# Patient Record
Sex: FEMALE | Race: WHITE | Employment: FULL TIME | URBAN - METROPOLITAN AREA
[De-identification: names, ages, dates, MRNs, and addresses within clinical notes are randomized per-mention and may not be internally consistent; named-entity substitution may affect disease eponyms.]

---

## 2017-03-21 ENCOUNTER — IP HISTORICAL/CONVERTED ENCOUNTER (OUTPATIENT)
Dept: OTHER | Age: 47
End: 2017-03-21

## 2018-01-24 ENCOUNTER — OFFICE VISIT (OUTPATIENT)
Dept: ENDOCRINOLOGY | Age: 48
End: 2018-01-24

## 2018-01-24 VITALS
OXYGEN SATURATION: 95 % | HEART RATE: 88 BPM | DIASTOLIC BLOOD PRESSURE: 89 MMHG | HEIGHT: 64 IN | WEIGHT: 179.2 LBS | BODY MASS INDEX: 30.59 KG/M2 | SYSTOLIC BLOOD PRESSURE: 131 MMHG | RESPIRATION RATE: 14 BRPM | TEMPERATURE: 98.4 F

## 2018-01-24 DIAGNOSIS — L68.0 HIRSUTISM: ICD-10-CM

## 2018-01-24 DIAGNOSIS — R53.83 FATIGUE, UNSPECIFIED TYPE: ICD-10-CM

## 2018-01-24 DIAGNOSIS — E04.9 NODULAR GOITER: Primary | ICD-10-CM

## 2018-01-24 RX ORDER — DICLOFENAC SODIUM 10 MG/G
GEL TOPICAL
Refills: 0 | COMMUNITY
Start: 2017-12-19

## 2018-01-24 RX ORDER — ESOMEPRAZOLE MAGNESIUM 40 MG/1
40 CAPSULE, DELAYED RELEASE ORAL DAILY
Refills: 0 | COMMUNITY
Start: 2018-01-13

## 2018-01-24 RX ORDER — TRAMADOL HYDROCHLORIDE 50 MG/1
TABLET ORAL
Refills: 0 | COMMUNITY
Start: 2017-12-25 | End: 2022-01-24

## 2018-01-24 RX ORDER — HYDROCHLOROTHIAZIDE 12.5 MG/1
12.5 TABLET ORAL DAILY
Refills: 0 | COMMUNITY
Start: 2018-01-13

## 2018-01-24 RX ORDER — LIRAGLUTIDE 6 MG/ML
1.2 INJECTION, SOLUTION SUBCUTANEOUS DAILY
COMMUNITY
Start: 2018-01-22

## 2018-01-24 RX ORDER — BUTALBITAL, ACETAMINOPHEN AND CAFFEINE 50; 325; 40 MG/1; MG/1; MG/1
TABLET ORAL
Refills: 0 | COMMUNITY
Start: 2018-01-22

## 2018-01-24 RX ORDER — ROSUVASTATIN CALCIUM 10 MG/1
TABLET, COATED ORAL
Refills: 0 | COMMUNITY
Start: 2018-01-22

## 2018-01-24 RX ORDER — GLUCOSAMINE/CHONDR SU A SOD 750-600 MG
1 TABLET ORAL DAILY
COMMUNITY

## 2018-01-24 RX ORDER — DOXYCYCLINE 100 MG/1
100 CAPSULE ORAL 2 TIMES DAILY
COMMUNITY
End: 2022-01-24

## 2018-01-24 RX ORDER — MELATONIN
DAILY
COMMUNITY

## 2018-01-24 NOTE — PROGRESS NOTES
Camryn Wilde MD        Patient Information  Date:1/24/2018  Name : Pierce Goldstein 52 y.o.     YOB: 1970         Referred by: Austin Ochoa MD         Chief Complaint   Patient presents with    Thyroid Problem       History of present illness    Raza Goldstein is a 52 y.o. female  here for follow up  of thyroid nodule. She was incidentally found to have subcentimeter thyroid nodules on carotid doppler and subsequently had dedicated thyroid ultrasound which confirmed. She has family history of thyroid cancer, type not known. Her TSH was normal.  She has arthralgia, fibromyalgia, sleep apnea,  She sleeps four hours which is again interrupted. She complains of extreme tiredness and fatigue, myalgia. She had thyroid function tests checked multiple times which is always been normal.   She has history of GERD     She had another ultrasound which was ordered by PCP, no change in the thyroid cysts    Complains of excessive hair growth, strong family history of hirsutism  No history of infertility, PCO S, testosterone exposure, acne, no irregular cycles in the past    Wt Readings from Last 3 Encounters:   01/24/18 179 lb 3.2 oz (81.3 kg)   11/04/16 185 lb (83.9 kg)       Past Medical History:   Diagnosis Date    ADD (attention deficit disorder)     Anxiety     Chronic headaches     Depression     Fibromyalgia     Hyperlipidemia     Hypertension     Sleep apnea     Thyroid cyst     Tobacco use disorder        Current Outpatient Prescriptions   Medication Sig    hydroCHLOROthiazide (HYDRODIURIL) 12.5 mg tablet Take 12.5 mg by mouth daily.  SAXENDA 3 mg/0.5 mL (18 mg/3 mL) pen 1.2 mg by SubCUTAneous route daily.  esomeprazole (NEXIUM) 40 mg capsule Take 40 mg by mouth daily.     rosuvastatin (CRESTOR) 10 mg tablet take 1 tablet by mouth once daily    traMADol (ULTRAM) 50 mg tablet take 1 tablet by mouth twice a day if needed    diclofenac (VOLTAREN) 1 % gel apply 2 GRAMS topically twice a day to affected area    butalbital-acetaminophen-caffeine (FIORICET, ESGIC) -40 mg per tablet take 1 tablet by mouth every 4 hours if needed for SEVERE HEADACHES    cholecalciferol (VITAMIN D3) 1,000 unit tablet Take  by mouth daily.  Biotin 2,500 mcg cap Take 1 Cap by mouth daily.  doxycycline (MONODOX) 100 mg capsule Take 100 mg by mouth two (2) times a day.  cyanocobalamin (VITAMIN B-12) 2,500 mcg sublingual tablet Take 1 Tab by mouth daily. No current facility-administered medications for this visit. Review of Systems:  -   - Respiratory: no cough or shortness of breath  - Gastrointestinal: no  abdominal pain  - Musculoskeletal: no joint pains or weakness  - Integumentary: no rashes  - Neurological: no numbness, tingling, or headaches  - Psychiatric: no depression or anxiety  - Endocrine: no heat or cold intolerance,    Physical Examination:  Blood pressure 131/89, pulse 88, temperature 98.4 °F (36.9 °C), temperature source Oral, resp. rate 14, height 5' 4\" (1.626 m), weight 179 lb 3.2 oz (81.3 kg), SpO2 95 %. Body mass index is 30.76 kg/(m^2). - General: pleasant, no distress, good eye contact  - HEENT: no exopthalmos, no periorbital edema, no scleral/conjunctival injection, EOMI, no lid lag or stare  - Neck: supple, no thyromegaly  - Cardiovascular: regular,  normal S1 and S2,   - Respiratory: clear to auscultation bilaterally  - Gastrointestinal: soft, nontender, nondistended, BS +  - Musculoskeletal: no proximal muscle weakness in upper or lower extremities  - Integumentary: no tremors, no edema  - Neurological: alert and oriented   - Psychiatric: normal mood and affect  - Skin - normal turgor    Data Reviewed:     TSH nl     [] Reviewed labs    Assessment/Plan:     1. Nodular goiter    2. Fatigue, unspecified type        Multinodular goiter    Incidental finding of thyroid cysts which are subcentimeter. Reviewed the images by me: She has no microcalcification, calcification reported is macrocalcification Vs colloid crystals. There are thyroid cysts which are subcentimeter. No indication for biopsy and no need for any follow-up ultrasound. Discussed the findings with the patient. She is biochemically euthyroid. The dysphagia she has is not related to her thyroid, she has underlying GERD which will be contributing. Hirsutism: Check testosterone, familial  hirsutism is a possibility  Obesity and insulin resistance can also cause elevated testosterone and hirsutism    Obesity: Euthyroid biochemically, late-night salivary cortisol was normal,  No reversible cause found, she has to work on lifestyle, improve the sleep        Follow-up Disposition: Not on File    Thank you for allowing me to participate in the care of this patient.     Savannah Gordillo MD      Follow-up

## 2018-01-24 NOTE — PROGRESS NOTES
Darian Goldstein is a 52 y.o. female here for   Chief Complaint   Patient presents with    Thyroid Problem       1. Have you been to the ER, urgent care clinic since your last visit? Hospitalized since your last visit? -[facility] March 2017 for gallbladder removal    2. Have you seen or consulted any other health care providers outside of the [address] [place] [address] since your last visit? Include any pap smears or colon screening. -PCP    Wt Readings from Last 3 Encounters:   11/04/16 185 lb (83.9 kg)     Temp Readings from Last 3 Encounters:   11/04/16 98.1 °F (36.7 °C) (Oral)     BP Readings from Last 3 Encounters:   11/04/16 (!) 143/91     Pulse Readings from Last 3 Encounters:   11/04/16 79

## 2018-01-24 NOTE — MR AVS SNAPSHOT
[address] [facility] [address] 
[phone] Patient: Chi Jay MRN: [id number] [facility]:72/95/2145 Visit Information Date & Time Provider Department Dept. Phone Encounter #  
 1/24/2018  2:15 PM Cherelle Beard MD [facility] Diabetes & Endocrinology [phone] [id number] Follow-up Instructions Return if symptoms worsen or fail to improve. Upcoming Health Maintenance Date Due Pneumococcal 19-64 Medium Risk (1 of 1 - PPSV23) 12/14/1989 DTaP/Tdap/Td series (1 - Tdap) 12/14/1991 PAP AKA CERVICAL CYTOLOGY 12/14/1991 Influenza Age 5 to Adult 8/1/2017 Allergies as of 1/24/2018  Review Complete On: 1/24/2018 By: Cherelle Beard MD  
  
 Severity Noted Reaction Type Reactions Penicillins  11/04/2016    Other (comments) Not sure of reaction. Was told as a child she's allergic Current Immunizations  Never Reviewed No immunizations on file. Not reviewed this visit You Were Diagnosed With   
  
 Codes Comments Nodular goiter    -  Primary ICD-10-CM: E04.9 ICD-9-CM: 241.9 Fatigue, unspecified type     ICD-10-CM: R53.83 ICD-9-CM: 780.79 Hirsutism     ICD-10-CM: L68.0 ICD-9-CM: 704.1 Vitals BP Pulse Temp Resp Height(growth percentile) Weight(growth percentile) 131/89 (BP 1 Location: Left arm, BP Patient Position: Sitting) 88 98.4 °F (36.9 °C) (Oral) 14 5' 4\" (1.626 m) 179 lb 3.2 oz (81.3 kg) SpO2 BMI OB Status Smoking Status 95% 30.76 kg/m2 Unknown Current Every Day Smoker Vitals History BMI and BSA Data Body Mass Index Body Surface Area 30.76 kg/m 2 1.92 m 2 Preferred Pharmacy Pharmacy Name Phone [id number] [address] [place] [address] [phone] Your Updated Medication List  
  
   
This list is accurate as of: 1/24/18  2:55 PM.  Always use your most recent med list.  
  
  
  
  
 Biotin 2,500 mcg Cap Take 1 Cap by mouth daily. butalbital-acetaminophen-caffeine -40 mg per tablet Commonly known as:  FIORICET, ESGIC  
take 1 tablet by mouth every 4 hours if needed for SEVERE HEADACHES  
  
 cyanocobalamin 2,500 mcg sublingual tablet Commonly known as:  VITAMIN B-12 Take 1 Tab by mouth daily. diclofenac 1 % Gel Commonly known as:  VOLTAREN  
apply 2 GRAMS topically twice a day to affected area  
  
 doxycycline 100 mg capsule Commonly known as:  Niki Shackleton Take 100 mg by mouth two (2) times a day. esomeprazole 40 mg capsule Commonly known as:  Terryann Early Take 40 mg by mouth daily. hydroCHLOROthiazide 12.5 mg tablet Commonly known as:  HYDRODIURIL Take 12.5 mg by mouth daily. rosuvastatin 10 mg tablet Commonly known as:  CRESTOR  
take 1 tablet by mouth once daily SAXENDA 3 mg/0.5 mL (18 mg/3 mL) pen Generic drug:  liraglutide 1.2 mg by SubCUTAneous route daily. traMADol 50 mg tablet Commonly known as:  ULTRAM  
take 1 tablet by mouth twice a day if needed VITAMIN D3 1,000 unit tablet Generic drug:  cholecalciferol Take  by mouth daily. We Performed the Following TESTOSTERONE, TOTAL, FEMALE/CHILD [id number] CPT(R)] Follow-up Instructions Return if symptoms worsen or fail to improve. Introducing [facility]! Julito Story introduces [employer] patient portal. Now you can access parts of your medical record, email your doctor's office, and request medication refills online. 1. In your internet browser, go to [email or web address] 2. Click on the First Time User? Click Here link in the Sign In box. You will see the New Member Sign Up page. 3. Enter your [employer] Access Code exactly as it appears below. You will not need to use this code after youve completed the sign-up process. If you do not sign up before the expiration date, you must request a new code.  
 
· [email or web address] Access Code: [email or web address] Expires: 4/24/2018  2:55 PM 
 
4. Enter the last four digits of your Social Security Number (xxxx) and Date of Birth (mm/dd/yyyy) as indicated and click Submit. You will be taken to the next sign-up page. 5. Create a [employer] ID. This will be your [employer] login ID and cannot be changed, so think of one that is secure and easy to remember. 6. Create a [employer] password. You can change your password at any time. 7. Enter your Password Reset Question and Answer. This can be used at a later time if you forget your password. 8. Enter your e-mail address. You will receive e-mail notification when new information is available in [address] 9. Click Sign Up. You can now view and download portions of your medical record. 10. Click the Download Summary menu link to download a portable copy of your medical information. If you have questions, please visit the Frequently Asked Questions section of the [email or web address] website. Remember, [email or web address] is NOT to be used for urgent needs. For medical emergencies, dial 911. Now available from your iPhone and Android! Please provide this summary of care documentation to your next provider. Your primary care clinician is listed as German Maradiaga. If you have any questions after today's visit, please call [phone].

## 2018-01-27 LAB — TESTOST SERPL-MCNC: 32.4 NG/DL

## 2018-01-29 ENCOUNTER — TELEPHONE (OUTPATIENT)
Dept: ENDOCRINOLOGY | Age: 48
End: 2018-01-29

## 2018-01-29 NOTE — TELEPHONE ENCOUNTER
Per Dr. Sandrita Schumacher, informed pt of result note, as noted above. Pt verbalized understanding with no further questions or concerns at this time.

## 2020-01-02 LAB — PAP SMEAR, EXTERNAL: NORMAL

## 2020-01-17 ENCOUNTER — OP HISTORICAL/CONVERTED ENCOUNTER (OUTPATIENT)
Dept: OTHER | Age: 50
End: 2020-01-17

## 2020-01-17 LAB — CREATININE, EXTERNAL: 0.87

## 2020-01-24 ENCOUNTER — OP HISTORICAL/CONVERTED ENCOUNTER (OUTPATIENT)
Dept: OTHER | Age: 50
End: 2020-01-24

## 2020-02-21 LAB — MAMMOGRAPHY, EXTERNAL: NORMAL

## 2020-08-26 VITALS
OXYGEN SATURATION: 98 % | HEART RATE: 75 BPM | DIASTOLIC BLOOD PRESSURE: 81 MMHG | WEIGHT: 178 LBS | BODY MASS INDEX: 30.39 KG/M2 | TEMPERATURE: 98.2 F | HEIGHT: 64 IN | SYSTOLIC BLOOD PRESSURE: 149 MMHG

## 2020-08-26 PROBLEM — N87.9 DYSPLASIA OF CERVIX: Status: ACTIVE | Noted: 2020-08-26

## 2020-08-26 PROBLEM — M79.7 FIBROMYALGIA: Status: ACTIVE | Noted: 2020-08-26

## 2020-08-26 PROBLEM — Z78.0 MENOPAUSE: Status: ACTIVE | Noted: 2020-08-26

## 2020-08-26 PROBLEM — R68.82 REDUCED LIBIDO: Status: ACTIVE | Noted: 2020-08-26

## 2020-08-26 PROBLEM — F32.A DEPRESSIVE DISORDER: Status: ACTIVE | Noted: 2020-08-26

## 2020-08-26 RX ORDER — MELOXICAM 15 MG/1
15 TABLET ORAL DAILY
COMMUNITY

## 2020-08-26 RX ORDER — CIPROFLOXACIN 500 MG/1
TABLET ORAL 2 TIMES DAILY
COMMUNITY
End: 2022-01-24

## 2020-08-26 RX ORDER — LIDOCAINE 50 MG/G
PATCH TOPICAL EVERY 24 HOURS
COMMUNITY
End: 2022-01-24

## 2020-08-26 RX ORDER — ETODOLAC 400 MG/1
TABLET, FILM COATED ORAL 2 TIMES DAILY WITH MEALS
COMMUNITY

## 2020-08-26 RX ORDER — ALPRAZOLAM 0.5 MG/1
TABLET ORAL
COMMUNITY

## 2020-08-26 RX ORDER — DIAZEPAM 5 MG/1
5 TABLET ORAL
COMMUNITY
End: 2022-01-24

## 2020-08-26 RX ORDER — OXYCODONE HYDROCHLORIDE 5 MG/1
5 TABLET ORAL
COMMUNITY
End: 2022-01-24

## 2020-08-26 RX ORDER — TROSPIUM CHLORIDE 20 MG/1
20 TABLET, FILM COATED ORAL
COMMUNITY

## 2020-08-26 RX ORDER — GABAPENTIN 600 MG/1
TABLET ORAL 3 TIMES DAILY
COMMUNITY

## 2020-08-26 RX ORDER — ESTRADIOL/NORETHINDRONE ACETATE TRANSDERMAL SYSTEM .05; .25 MG/D; MG/D
1 PATCH, EXTENDED RELEASE TRANSDERMAL 2 TIMES WEEKLY
COMMUNITY
End: 2022-01-24

## 2020-08-26 RX ORDER — SULFAMETHOXAZOLE AND TRIMETHOPRIM 800; 160 MG/1; MG/1
1 TABLET ORAL 2 TIMES DAILY
COMMUNITY
End: 2022-01-24

## 2020-08-26 RX ORDER — PANTOPRAZOLE SODIUM 40 MG/1
40 TABLET, DELAYED RELEASE ORAL DAILY
COMMUNITY
End: 2022-01-24

## 2020-08-26 RX ORDER — ESCITALOPRAM OXALATE 20 MG/1
20 TABLET ORAL DAILY
COMMUNITY

## 2020-08-26 RX ORDER — METOPROLOL TARTRATE 25 MG/1
TABLET, FILM COATED ORAL 2 TIMES DAILY
COMMUNITY

## 2020-08-26 RX ORDER — ERGOCALCIFEROL 1.25 MG/1
50000 CAPSULE ORAL
COMMUNITY
End: 2022-01-24

## 2020-08-26 RX ORDER — CHLORPHENIRAMINE MALEATE 4 MG
TABLET ORAL 2 TIMES DAILY
COMMUNITY

## 2020-12-22 ENCOUNTER — OFFICE VISIT (OUTPATIENT)
Dept: OBGYN CLINIC | Age: 50
End: 2020-12-22
Payer: COMMERCIAL

## 2020-12-22 VITALS
WEIGHT: 180.13 LBS | HEIGHT: 64 IN | DIASTOLIC BLOOD PRESSURE: 70 MMHG | BODY MASS INDEX: 30.75 KG/M2 | SYSTOLIC BLOOD PRESSURE: 112 MMHG

## 2020-12-22 DIAGNOSIS — Z12.4 SCREENING FOR MALIGNANT NEOPLASM OF CERVIX: Primary | ICD-10-CM

## 2020-12-22 DIAGNOSIS — Z01.419 ROUTINE GYNECOLOGICAL EXAMINATION: ICD-10-CM

## 2020-12-22 PROCEDURE — 99396 PREV VISIT EST AGE 40-64: CPT | Performed by: OBSTETRICS & GYNECOLOGY

## 2020-12-22 RX ORDER — CLOTRIMAZOLE AND BETAMETHASONE DIPROPIONATE 10; .64 MG/G; MG/G
CREAM TOPICAL
Qty: 45 G | Refills: 1 | Status: SHIPPED | OUTPATIENT
Start: 2020-12-22

## 2020-12-22 NOTE — PROGRESS NOTES
Domonique Mac is a , 48 y.o. female   No LMP recorded. Patient has had an ablation. She presents for her annual    She is having no significant problems. Menstrual status:  Her periods are: ablation. Cycles are: ablation. She does not have dysmenorrhea. Medical conditions:  Since her last annual GYN exam about one year ago, she has not the following changes in her health history: none. Past Medical History:   Diagnosis Date    ADD (attention deficit disorder)     Anxiety     Chronic headaches     Depression     Fibromyalgia     Hyperlipidemia     Hypertension     Sleep apnea     Thyroid cyst     Tobacco use disorder      Past Surgical History:   Procedure Laterality Date    HX APPENDECTOMY      HX CHOLECYSTECTOMY      HX DILATION AND CURETTAGE      ablation    HX OTHER SURGICAL Left 2015    wrist    HX OTHER SURGICAL      novasure    HX RIGHT SALPINGO-OOPHORECTOMY Right 2020    HX TONSIL AND ADENOIDECTOMY      HX TUBAL LIGATION Bilateral        Prior to Admission medications    Medication Sig Start Date End Date Taking? Authorizing Provider   clotrimazole-betamethasone (LOTRISONE) topical cream Apply a small amount BID to affected area as needed 20  Yes Dylan Riddle MD   BUPROPION HCL PO Take  by mouth. Yes Provider, Historical   estradiol-norethindrone (CombiPatch) 0.05-0.25 mg/24 hr 1 Patch by TransDERmal route two (2) times a week. Yes Provider, Historical   etodolac (LODINE) 400 mg tablet Take  by mouth two (2) times daily (with meals). Yes Provider, Historical   esomeprazole (NEXIUM) 40 mg capsule Take 40 mg by mouth daily. 18  Yes Provider, Historical   butalbital-acetaminophen-caffeine (FIORICET, ESGIC) -40 mg per tablet take 1 tablet by mouth every 4 hours if needed for SEVERE HEADACHES 18  Yes Provider, Historical   escitalopram oxalate (LEXAPRO) 20 mg tablet Take 20 mg by mouth daily.     Provider, Historical gabapentin (NEURONTIN) 600 mg tablet Take  by mouth three (3) times daily. Provider, Historical   meloxicam (MOBIC) 15 mg tablet Take 15 mg by mouth daily. Provider, Historical   metoprolol tartrate (LOPRESSOR) 25 mg tablet Take  by mouth two (2) times a day. Provider, Historical   pantoprazole (PROTONIX) 40 mg tablet Take 40 mg by mouth daily. Provider, Historical   diazePAM (Valium) 5 mg tablet Take 5 mg by mouth every six (6) hours as needed for Anxiety. Provider, Historical   ergocalciferol (Vitamin D2) 1,250 mcg (50,000 unit) capsule Take 50,000 Units by mouth. Provider, Historical   ALPRAZolam (Xanax) 0.5 mg tablet Take  by mouth. Provider, Historical   ciprofloxacin HCl (CIPRO) 500 mg tablet Take  by mouth two (2) times a day. Provider, Historical   clotrimazole (LOTRIMIN) 1 % topical cream Apply  to affected area two (2) times a day. Provider, Historical   lidocaine (LIDODERM) 5 % by TransDERmal route every twenty-four (24) hours. Apply patch to the affected area for 12 hours a day and remove for 12 hours a day. Provider, Historical   oxyCODONE IR (ROXICODONE) 5 mg immediate release tablet Take 5 mg by mouth every four (4) hours as needed for Pain. Provider, Historical   trimethoprim-sulfamethoxazole (BACTRIM DS, SEPTRA DS) 160-800 mg per tablet Take 1 Tab by mouth two (2) times a day. Provider, Historical   trospium (SANCTURA) 20 mg tablet Take 20 mg by mouth Before breakfast, lunch, and dinner. Provider, Historical   hydroCHLOROthiazide (HYDRODIURIL) 12.5 mg tablet Take 12.5 mg by mouth daily. 1/13/18   Provider, Historical   SAXENDA 3 mg/0.5 mL (18 mg/3 mL) pen 1.2 mg by SubCUTAneous route daily.  1/22/18   Provider, Historical   rosuvastatin (CRESTOR) 10 mg tablet take 1 tablet by mouth once daily 1/22/18   Provider, Historical   traMADol (ULTRAM) 50 mg tablet take 1 tablet by mouth twice a day if needed 12/25/17   Provider, Historical   diclofenac (VOLTAREN) 1 % gel apply 2 GRAMS topically twice a day to affected area 12/19/17   Provider, Historical   cholecalciferol (VITAMIN D3) 1,000 unit tablet Take  by mouth daily. Provider, Historical   Biotin 2,500 mcg cap Take 1 Cap by mouth daily. Provider, Historical   doxycycline (MONODOX) 100 mg capsule Take 100 mg by mouth two (2) times a day. Provider, Historical   cyanocobalamin (VITAMIN B-12) 2,500 mcg sublingual tablet Take 1 Tab by mouth daily. 11/4/16   Michelle Parks MD       Allergies   Allergen Reactions    Penicillins Rash          Tobacco History:  reports that she has been smoking. She has been smoking about 0.50 packs per day. She has never used smokeless tobacco.  Alcohol Abuse:  reports no history of alcohol use. Drug Abuse:  reports no history of drug use. Family Medical/Cancer History:   Family History   Problem Relation Age of Onset    Thyroid Disease Mother     Heart Disease Mother     Cancer Father     Thyroid Cancer Maternal Aunt     Thyroid Disease Maternal Grandmother     Stroke Maternal Grandmother     Heart Disease Maternal Grandfather     Cancer Paternal Grandmother     Cancer Paternal Grandfather     Diabetes Other     Hypertension Other           Review of Systems   Constitutional: Negative for chills, fever, malaise/fatigue and weight loss. HENT: Negative for congestion, ear pain, sinus pain and tinnitus. Eyes: Negative for blurred vision and double vision. Respiratory: Negative for cough, shortness of breath and wheezing. Cardiovascular: Negative for chest pain and palpitations. Gastrointestinal: Negative for abdominal pain, blood in stool, constipation, diarrhea, heartburn, nausea and vomiting. Genitourinary: Negative for dysuria, flank pain, frequency, hematuria and urgency. Musculoskeletal: Negative for joint pain and myalgias. Skin: Negative for itching and rash. Neurological: Negative for dizziness, weakness and headaches. Psychiatric/Behavioral: Negative for depression, memory loss and suicidal ideas. The patient is not nervous/anxious and does not have insomnia. Physical Exam  Constitutional:       Appearance: Normal appearance. HENT:      Head: Normocephalic and atraumatic. Cardiovascular:      Rate and Rhythm: Normal rate. Heart sounds: Normal heart sounds. Pulmonary:      Effort: Pulmonary effort is normal.      Breath sounds: Normal breath sounds. Chest:      Breasts:         Right: Normal.         Left: Normal.       Abdominal:      General: Abdomen is flat. Bowel sounds are normal.      Palpations: Abdomen is soft. Genitourinary:     General: Normal vulva. Vagina: Normal.      Cervix: Normal.      Uterus: Normal.       Adnexa: Right adnexa normal and left adnexa normal.      Rectum: Normal.      Comments: PAP Obtained  Neurological:      Mental Status: She is alert. Psychiatric:         Mood and Affect: Mood normal.         Behavior: Behavior normal.         Thought Content: Thought content normal.          Visit Vitals  /70 (BP 1 Location: Left arm, BP Patient Position: Sitting)   Ht 5' 4\" (1.626 m)   Wt 180 lb 2 oz (81.7 kg)   BMI 30.92 kg/m²         Assessment:  Diagnoses and all orders for this visit:    1. Screening for malignant neoplasm of cervix  -     PAP, LB, RFX HPV SIUPT([id number])    2. Routine gynecological examination  -     PAP, LB, RFX HPV ITRVO([id number])    Other orders  -     clotrimazole-betamethasone (LOTRISONE) topical cream; Apply a small amount BID to affected area as needed        Plan:Questions addressed, f/u with PCP  Counseled re: diet, exercise, healthy lifestyle  Return for Annual  Rec annual mammogram        Follow-up and Dispositions    · Return for 1 yr annual, 1 yr mammo.

## 2020-12-30 LAB
CYTOLOGIST CVX/VAG CYTO: NORMAL
CYTOLOGY CVX/VAG DOC CYTO: NORMAL
DX ICD CODE: NORMAL
LABCORP, 190119: NORMAL
Lab: NORMAL
OTHER STN SPEC: NORMAL
STAT OF ADQ CVX/VAG CYTO-IMP: NORMAL

## 2021-03-03 ENCOUNTER — DOCUMENTATION ONLY (OUTPATIENT)
Dept: OBGYN CLINIC | Age: 51
End: 2021-03-03

## 2021-07-19 ENCOUNTER — TELEPHONE (OUTPATIENT)
Dept: OBGYN CLINIC | Age: 51
End: 2021-07-19

## 2021-07-19 NOTE — TELEPHONE ENCOUNTER
Returned the patient's call regarding her request to speak with Dr Yair Arriola. She states she would like to speak with him regarding his recommendation on the Covid 19 vaccine. Advised the patient Dr Yair Arriola is recommending patients get vaccinated. She states he would still like to speak with him. Advised her he is with patients but her message would be forwarded to him.

## 2021-09-27 ENCOUNTER — TELEPHONE (OUTPATIENT)
Dept: OBGYN CLINIC | Age: 51
End: 2021-09-27

## 2021-09-27 NOTE — TELEPHONE ENCOUNTER
Returned the patient's call and she is asking about breast cancer testing that Dr Stacie Bowen recommended. States she would like to know what test she needs to have. Per Dr Stacie Bowen, the patient needs to have BRCA 1 and @ testing. She states she spoke with her father and his mother, grandmother and aunt all had breast cancer. She will contact her insurance company to find out if the testing is covered.

## 2021-10-06 DIAGNOSIS — Z80.3 FAMILY HISTORY OF MALIGNANT NEOPLASM OF BREAST: Primary | ICD-10-CM

## 2021-10-25 ENCOUNTER — TELEPHONE (OUTPATIENT)
Dept: OBGYN CLINIC | Age: 51
End: 2021-10-25

## 2021-10-25 NOTE — TELEPHONE ENCOUNTER
Received a call from Khai Everett regarding the patient's BRCA testing. Was advised Dr Marilu Wick will need to contact the insurance regarding information only he can provide and needs to call [facility] at [phone]. Information forwarded to Dr Marilu Wick.

## 2021-11-01 LAB
BRCA1+BRCA2 MUT ANL BLD/T: NORMAL
PREAUTHORIZATION: NORMAL

## 2021-11-01 NOTE — TELEPHONE ENCOUNTER
Received another call stating the patient's insurance had denied the BRCA testing and Dr Adeline Abraham could appeal it. Per Dr Adeline Abraham he tried calling to appeal the decision and was on hold for 45 minutes. He states he will try again later. Contact number is [phone].   The patient's out of pocket would be $1,447.50

## 2021-11-02 NOTE — TELEPHONE ENCOUNTER
Called and LVM for pt with number to call to give additional information regarding family hx to see if test can be covered

## 2021-11-03 NOTE — TELEPHONE ENCOUNTER
Patient called stating she had received a call from [facility] and was given a number to contact her insurance. States she missed Dr Jacinto Dominguez call and he had given her a number to call also. Advised her that number he had is for doctors to call to appeal the decision made by her insurance. She states she is supposed to be scheduled for genetic counseling and was told it will take between 45 to 60 minutes and that her specimen already drawn should be good for 60 days.

## 2021-11-24 ENCOUNTER — TELEPHONE (OUTPATIENT)
Dept: OBGYN CLINIC | Age: 51
End: 2021-11-24

## 2021-11-24 NOTE — TELEPHONE ENCOUNTER
Spoke with the patient and advised her the test kit has been ordered from [facility] and should arrive next week. She will be notified once the kit arrives in our office so she can come in to have the testing done. [facility] 3 different [facility] (1 for blood, 1 for saliva and 1 buccal). Dr Mihir Hernandez is to determine which kit he would like the patient to use.

## 2021-12-22 ENCOUNTER — TELEPHONE (OUTPATIENT)
Dept: OBGYN CLINIC | Age: 51
End: 2021-12-22

## 2021-12-22 NOTE — TELEPHONE ENCOUNTER
Received a fax from [employer] w/ [employer] on 12/21/22 requesting clarification on the test ordered for the patient. Called back and spoke with Alisha Yu at [facility] to give him clarification. Advised him of the fax we received yesterday and he states test code number 12588 is a valid test code and the other code that was entered was a CPT code (94207). He states he will submit the information to Rhian[facility].

## 2022-01-05 ENCOUNTER — TELEPHONE (OUTPATIENT)
Dept: OBGYN CLINIC | Age: 52
End: 2022-01-05

## 2022-01-05 NOTE — TELEPHONE ENCOUNTER
Patient called for the results of her genetic testing for breast cancer and was advised it was negative.

## 2022-01-06 ENCOUNTER — DOCUMENTATION ONLY (OUTPATIENT)
Dept: OBGYN CLINIC | Age: 52
End: 2022-01-06

## 2022-01-21 ENCOUNTER — TELEPHONE (OUTPATIENT)
Dept: OBGYN CLINIC | Age: 52
End: 2022-01-21

## 2022-01-24 ENCOUNTER — OFFICE VISIT (OUTPATIENT)
Dept: OBGYN CLINIC | Age: 52
End: 2022-01-24
Payer: COMMERCIAL

## 2022-01-24 VITALS
HEIGHT: 64 IN | DIASTOLIC BLOOD PRESSURE: 74 MMHG | SYSTOLIC BLOOD PRESSURE: 116 MMHG | BODY MASS INDEX: 28.21 KG/M2 | WEIGHT: 165.25 LBS

## 2022-01-24 DIAGNOSIS — Z01.419 ROUTINE GYNECOLOGICAL EXAMINATION: ICD-10-CM

## 2022-01-24 DIAGNOSIS — Z12.4 SCREENING FOR MALIGNANT NEOPLASM OF CERVIX: Primary | ICD-10-CM

## 2022-01-24 PROCEDURE — 99396 PREV VISIT EST AGE 40-64: CPT | Performed by: OBSTETRICS & GYNECOLOGY

## 2022-01-24 NOTE — PROGRESS NOTES
Chief Complaint   Patient presents with    Annual Exam     Mammo done here today     Visit Vitals  /74 (BP 1 Location: Left upper arm, BP Patient Position: Sitting, BP Cuff Size: Small adult)   Ht 5' 4\" (1.626 m)   Wt 165 lb 4 oz (75 kg)   BMI 28.37 kg/m²

## 2022-01-24 NOTE — PROGRESS NOTES
John Terry is a , 46 y.o. female   No LMP recorded. Patient has had an ablation. She presents for her annual    She is having no significant problems. Menstrual status:  Cycles are light/nonexistent due to ablation. Flow: absent. She does not have dysmenorrhea. Medical conditions:  Since her last annual GYN exam about one year ago, she has not the following changes in her health history: none. Mammogram History:    ABNER Results (most recent):  No results found for this or any previous visit. DEXA Results (most recent):  No results found for this or any previous visit. Past Medical History:   Diagnosis Date    ADD (attention deficit disorder)     Anxiety     Chronic headaches     Depression     Fibromyalgia     Hyperlipidemia     Hypertension     Sleep apnea     Thyroid cyst     Tobacco use disorder      Past Surgical History:   Procedure Laterality Date    HX APPENDECTOMY      HX CHOLECYSTECTOMY      HX DILATION AND CURETTAGE      ablation    HX OTHER SURGICAL Left 2015    wrist    HX OTHER SURGICAL      novasure    HX RIGHT SALPINGO-OOPHORECTOMY Right 2020    HX TONSIL AND ADENOIDECTOMY      HX TUBAL LIGATION Bilateral        Prior to Admission medications    Medication Sig Start Date End Date Taking? Authorizing Provider   clotrimazole-betamethasone (LOTRISONE) topical cream Apply a small amount BID to affected area as needed 20  Yes Gavin Palmer MD   BUPROPION HCL PO Take  by mouth. Yes Provider, Historical   escitalopram oxalate (LEXAPRO) 20 mg tablet Take 20 mg by mouth daily. Yes Provider, Historical   gabapentin (NEURONTIN) 600 mg tablet Take  by mouth three (3) times daily. Yes Provider, Historical   meloxicam (MOBIC) 15 mg tablet Take 15 mg by mouth daily. Yes Provider, Historical   metoprolol tartrate (LOPRESSOR) 25 mg tablet Take  by mouth two (2) times a day.    Yes Provider, Historical   ALPRAZolam (Xanax) 0.5 mg tablet Take  by mouth. Yes Provider, Historical   clotrimazole (LOTRIMIN) 1 % topical cream Apply  to affected area two (2) times a day. Yes Provider, Historical   etodolac (LODINE) 400 mg tablet Take  by mouth two (2) times daily (with meals). Yes Provider, Historical   trospium (SANCTURA) 20 mg tablet Take 20 mg by mouth Before breakfast, lunch, and dinner. Yes Provider, Historical   hydroCHLOROthiazide (HYDRODIURIL) 12.5 mg tablet Take 12.5 mg by mouth daily. 1/13/18  Yes Provider, Historical   SAXENDA 3 mg/0.5 mL (18 mg/3 mL) pen 1.2 mg by SubCUTAneous route daily. 1/22/18  Yes Provider, Historical   esomeprazole (NEXIUM) 40 mg capsule Take 40 mg by mouth daily. 1/13/18  Yes Provider, Historical   rosuvastatin (CRESTOR) 10 mg tablet take 1 tablet by mouth once daily 1/22/18  Yes Provider, Historical   diclofenac (VOLTAREN) 1 % gel apply 2 GRAMS topically twice a day to affected area 12/19/17  Yes Provider, Historical   butalbital-acetaminophen-caffeine (FIORICET, ESGIC) -40 mg per tablet take 1 tablet by mouth every 4 hours if needed for SEVERE HEADACHES 1/22/18  Yes Provider, Historical   cholecalciferol (VITAMIN D3) 1,000 unit tablet Take  by mouth daily. Yes Provider, Historical   Biotin 2,500 mcg cap Take 1 Cap by mouth daily. Yes Provider, Historical   cyanocobalamin (VITAMIN B-12) 2,500 mcg sublingual tablet Take 1 Tab by mouth daily. 11/4/16  Yes Zac Livingston MD       Allergies   Allergen Reactions    Penicillins Rash          Tobacco History:  reports that she has been smoking. She has been smoking about 0.50 packs per day. She has never used smokeless tobacco.  Alcohol Abuse:  reports no history of alcohol use. Drug Abuse:  reports no history of drug use.     Family Medical/Cancer History:   Family History   Problem Relation Age of Onset    Thyroid Disease Mother     Heart Disease Mother     Cancer Father     Thyroid Cancer Maternal Aunt     Thyroid Disease Maternal Grandmother     Stroke Maternal Grandmother     Heart Disease Maternal Grandfather     Cancer Paternal Grandmother     Cancer Paternal Grandfather     Diabetes Other     Hypertension Other           Review of Systems   Constitutional: Negative for chills, fever, malaise/fatigue and weight loss. HENT: Negative for congestion, ear pain, sinus pain and tinnitus. Eyes: Negative for blurred vision and double vision. Respiratory: Negative for cough, shortness of breath and wheezing. Cardiovascular: Negative for chest pain and palpitations. Gastrointestinal: Negative for abdominal pain, blood in stool, constipation, diarrhea, heartburn, nausea and vomiting. Genitourinary: Negative for dysuria, flank pain, frequency, hematuria and urgency. Musculoskeletal: Negative for joint pain and myalgias. Skin: Negative for itching and rash. Neurological: Negative for dizziness, weakness and headaches. Psychiatric/Behavioral: Negative for depression, memory loss and suicidal ideas. The patient is not nervous/anxious and does not have insomnia. Physical Exam  Constitutional:       Appearance: Normal appearance. HENT:      Head: Normocephalic and atraumatic. Cardiovascular:      Rate and Rhythm: Normal rate. Heart sounds: Normal heart sounds. Pulmonary:      Effort: Pulmonary effort is normal.      Breath sounds: Normal breath sounds. Chest:   Breasts:      Right: Normal.      Left: Normal.       Abdominal:      General: Abdomen is flat. Palpations: Abdomen is soft. Genitourinary:     General: Normal vulva. Vagina: Normal.      Cervix: Normal.      Uterus: Normal.       Adnexa: Right adnexa normal and left adnexa normal.      Rectum: Normal.      Comments: PAP Obtained  Neurological:      Mental Status: She is alert. Psychiatric:         Mood and Affect: Mood normal.         Behavior: Behavior normal.         Thought Content:  Thought content normal.          Visit Vitals  BP 116/74 (BP 1 Location: Left upper arm, BP Patient Position: Sitting, BP Cuff Size: Small adult)   Ht 5' 4\" (1.626 m)   Wt 165 lb 4 oz (75 kg)   BMI 28.37 kg/m²         Assessment:   Diagnoses and all orders for this visit:    1. Screening for malignant neoplasm of cervix  -     PAP IG, RFX APTIMA HPV ASCUS ([id number])    2. Routine gynecological examination  -     PAP IG, RFX APTIMA HPV ASCUS ([id number])        Plan: Questions addressed  Counseled re: diet, exercise, healthy lifestyle  Return for Annual  Rec annual mammogram        Follow-up and Dispositions    · Return for 1 yr annual, 1 yr mammo.

## 2022-01-26 LAB
CYTOLOGIST CVX/VAG CYTO: NORMAL
CYTOLOGY CVX/VAG DOC CYTO: NORMAL
CYTOLOGY CVX/VAG DOC THIN PREP: NORMAL
DX ICD CODE: NORMAL
LABCORP, 190119: NORMAL
Lab: NORMAL
OTHER STN SPEC: NORMAL
STAT OF ADQ CVX/VAG CYTO-IMP: NORMAL

## 2022-03-19 PROBLEM — M79.7 FIBROMYALGIA: Status: ACTIVE | Noted: 2020-08-26

## 2022-03-19 PROBLEM — R68.82 REDUCED LIBIDO: Status: ACTIVE | Noted: 2020-08-26

## 2022-03-19 PROBLEM — Z78.0 MENOPAUSE: Status: ACTIVE | Noted: 2020-08-26

## 2022-03-19 PROBLEM — F32.A DEPRESSIVE DISORDER: Status: ACTIVE | Noted: 2020-08-26

## 2022-03-20 PROBLEM — N87.9 DYSPLASIA OF CERVIX: Status: ACTIVE | Noted: 2020-08-26

## 2023-01-30 ENCOUNTER — OFFICE VISIT (OUTPATIENT)
Dept: OBGYN CLINIC | Age: 53
End: 2023-01-30

## 2023-01-30 VITALS
SYSTOLIC BLOOD PRESSURE: 114 MMHG | HEIGHT: 64 IN | DIASTOLIC BLOOD PRESSURE: 68 MMHG | WEIGHT: 160.25 LBS | BODY MASS INDEX: 27.36 KG/M2

## 2023-01-30 DIAGNOSIS — Z01.419 ROUTINE GYNECOLOGICAL EXAMINATION: ICD-10-CM

## 2023-01-30 DIAGNOSIS — N95.1 MENOPAUSAL SYNDROME: ICD-10-CM

## 2023-01-30 DIAGNOSIS — Z12.4 SCREENING FOR MALIGNANT NEOPLASM OF CERVIX: Primary | ICD-10-CM

## 2023-01-30 PROCEDURE — 99396 PREV VISIT EST AGE 40-64: CPT | Performed by: OBSTETRICS & GYNECOLOGY

## 2023-01-30 NOTE — PROGRESS NOTES
Chief Complaint   Patient presents with    Annual Exam     Mammo done today     Visit Vitals  /68 (BP 1 Location: Left upper arm, BP Patient Position: Sitting, BP Cuff Size: Small adult)   Ht 5' 4\" (1.626 m)   Wt 160 lb 4 oz (72.7 kg)   BMI 27.51 kg/m²

## 2023-01-30 NOTE — PROGRESS NOTES
Suhas Canada is a , 46 y.o. female   No LMP recorded. Patient has had an ablation. She presents for her annual    She is having no significant problems. Menstrual status:  Cycles are light/nonexistent due to ablation. Flow: absent. She does not have dysmenorrhea. Medical conditions:  Since her last annual GYN exam about one year ago, she has not the following changes in her health history: none. Mammogram History:    [facility] Results (most recent):  Results from Appointment encounter on 23    [facility] MAMMO BI SCREENING INCL CAD    Narrative  STUDY: Bilateral digital screening mammogram    INDICATION:  Screening. COMPARISON:     BREAST COMPOSITION:  There are scattered areas of fibroglandular density. FINDINGS: Bilateral digital screening mammography was performed and is  interpreted in conjunction with a computer assisted detection (CAD) system. No  suspicious masses or calcifications are identified. There has been no  significant change. Impression  BI-RADS 1: Negative. No mammographic evidence of malignancy. RECOMMENDATIONS:  Next screening mammogram is recommended in one year. The patient will be notified of these results. DEXA Results (most recent):  No results found for this or any previous visit. Past Medical History:   Diagnosis Date    ADD (attention deficit disorder)     Anxiety     Chronic headaches     Depression     Fibromyalgia     Hyperlipidemia     Hypertension     Sleep apnea     Thyroid cyst     Tobacco use disorder      Past Surgical History:   Procedure Laterality Date    HX APPENDECTOMY      HX CHOLECYSTECTOMY      HX DILATION AND CURETTAGE      ablation    HX OTHER SURGICAL Left 2015    wrist    HX OTHER SURGICAL      novasure    HX RIGHT SALPINGO-OOPHORECTOMY Right 2020    HX TONSIL AND ADENOIDECTOMY      HX TUBAL LIGATION Bilateral        Prior to Admission medications    Medication Sig Start Date End Date Taking? Authorizing Provider   clotrimazole-betamethasone (LOTRISONE) topical cream Apply a small amount BID to affected area as needed 12/22/20  Yes Neli Scanlon MD   BUPROPION HCL PO Take  by mouth. Yes Provider, Historical   escitalopram oxalate (LEXAPRO) 20 mg tablet Take 20 mg by mouth daily. Yes Provider, Historical   gabapentin (NEURONTIN) 600 mg tablet Take  by mouth three (3) times daily. Yes Provider, Historical   meloxicam (MOBIC) 15 mg tablet Take 15 mg by mouth daily. Yes Provider, Historical   metoprolol tartrate (LOPRESSOR) 25 mg tablet Take  by mouth two (2) times a day. Yes Provider, Historical   ALPRAZolam (XANAX) 0.5 mg tablet Take  by mouth. Yes Provider, Historical   clotrimazole (LOTRIMIN) 1 % topical cream Apply  to affected area two (2) times a day. Yes Provider, Historical   etodolac (LODINE) 400 mg tablet Take  by mouth two (2) times daily (with meals). Yes Provider, Historical   trospium (SANCTURA) 20 mg tablet Take 20 mg by mouth Before breakfast, lunch, and dinner. Yes Provider, Historical   hydroCHLOROthiazide (HYDRODIURIL) 12.5 mg tablet Take 12.5 mg by mouth daily. 1/13/18  Yes Provider, Historical   SAXENDA 3 mg/0.5 mL (18 mg/3 mL) pen 1.2 mg by SubCUTAneous route daily. 1/22/18  Yes Provider, Historical   esomeprazole (NEXIUM) 40 mg capsule Take 40 mg by mouth daily. 1/13/18  Yes Provider, Historical   rosuvastatin (CRESTOR) 10 mg tablet take 1 tablet by mouth once daily 1/22/18  Yes Provider, Historical   diclofenac (VOLTAREN) 1 % gel apply 2 GRAMS topically twice a day to affected area 12/19/17  Yes Provider, Historical   butalbital-acetaminophen-caffeine (FIORICET, ESGIC) -40 mg per tablet take 1 tablet by mouth every 4 hours if needed for SEVERE HEADACHES 1/22/18  Yes Provider, Historical   cholecalciferol (VITAMIN D3) (1000 Units /25 mcg) tablet Take  by mouth daily. Yes Provider, Historical   Biotin 2,500 mcg cap Take 1 Cap by mouth daily.    Yes Provider, Historical       Allergies   Allergen Reactions    Penicillins Rash          Tobacco History:  reports that she has been smoking cigarettes. She has been smoking an average of .5 packs per day. She has never used smokeless tobacco.  Alcohol use:  reports no history of alcohol use. Drug use:  reports no history of drug use. Family Medical/Cancer History:   Family History   Problem Relation Age of Onset    Thyroid Disease Mother     Heart Disease Mother     Cancer Father     Thyroid Cancer Maternal Aunt     Thyroid Disease Maternal Grandmother     Stroke Maternal Grandmother     Heart Disease Maternal Grandfather     Cancer Paternal Grandmother     Cancer Paternal Grandfather     Diabetes Other     Hypertension Other           Review of Systems   Constitutional:  Negative for chills, fever, malaise/fatigue and weight loss. HENT:  Negative for congestion, ear pain, sinus pain and tinnitus. Eyes:  Negative for blurred vision and double vision. Respiratory:  Negative for cough, shortness of breath and wheezing. Cardiovascular:  Negative for chest pain and palpitations. Gastrointestinal:  Negative for abdominal pain, blood in stool, constipation, diarrhea, heartburn, nausea and vomiting. Genitourinary:  Negative for dysuria, flank pain, frequency, hematuria and urgency. Musculoskeletal:  Negative for joint pain and myalgias. Skin:  Negative for itching and rash. Neurological:  Negative for dizziness, weakness and headaches. Psychiatric/Behavioral:  Negative for depression, memory loss and suicidal ideas. The patient is not nervous/anxious and does not have insomnia. Physical Exam  Constitutional:       Appearance: Normal appearance. HENT:      Head: Normocephalic and atraumatic. Cardiovascular:      Rate and Rhythm: Normal rate. Heart sounds: Normal heart sounds. Pulmonary:      Effort: Pulmonary effort is normal.      Breath sounds: Normal breath sounds.    Chest:   Breasts: Right: Normal.      Left: Normal.   Abdominal:      General: Abdomen is flat. Palpations: Abdomen is soft. Genitourinary:     General: Normal vulva. Vagina: Normal.      Cervix: Normal.      Uterus: Normal.       Adnexa: Right adnexa normal and left adnexa normal.      Rectum: Normal.      Comments: PAP Obtained  Neurological:      Mental Status: She is alert. Psychiatric:         Mood and Affect: Mood normal.         Behavior: Behavior normal.         Thought Content: Thought content normal.        Visit Vitals  /68 (BP 1 Location: Left upper arm, BP Patient Position: Sitting, BP Cuff Size: Small adult)   Ht 5' 4\" (1.626 m)   Wt 160 lb 4 oz (72.7 kg)   BMI 27.51 kg/m²         Assessment: Diagnoses and all orders for this visit:    1. Screening for malignant neoplasm of cervix  -     PAP IG, RFX APTIMA HPV ASCUS ([id number])    2. Routine gynecological examination  -     PAP IG, RFX APTIMA HPV ASCUS ([id number])    3. Menopausal syndrome      Plan: Questions addressed  Counseled re: diet, exercise, healthy lifestyle  Return for Annual  Rec annual mammogram        Follow-up and Dispositions    Return for 1 yr annual, 1 yr mammo.

## 2023-02-01 LAB
CYTOLOGIST CVX/VAG CYTO: NORMAL
CYTOLOGY CVX/VAG DOC CYTO: NORMAL
CYTOLOGY CVX/VAG DOC THIN PREP: NORMAL
DX ICD CODE: NORMAL
LABCORP, 190119: NORMAL
Lab: NORMAL
Lab: NORMAL
OTHER STN SPEC: NORMAL
STAT OF ADQ CVX/VAG CYTO-IMP: NORMAL

## 2024-11-22 ENCOUNTER — TELEPHONE (OUTPATIENT)
Age: 54
End: 2024-11-22

## 2024-11-22 DIAGNOSIS — Z12.31 SCREENING MAMMOGRAM, ENCOUNTER FOR: Primary | ICD-10-CM

## 2024-12-24 ENCOUNTER — OFFICE VISIT (OUTPATIENT)
Age: 54
End: 2024-12-24
Payer: COMMERCIAL

## 2024-12-24 VITALS
DIASTOLIC BLOOD PRESSURE: 77 MMHG | WEIGHT: 174.19 LBS | BODY MASS INDEX: 29.74 KG/M2 | HEIGHT: 64 IN | SYSTOLIC BLOOD PRESSURE: 122 MMHG

## 2024-12-24 DIAGNOSIS — Z12.4 PAP SMEAR FOR CERVICAL CANCER SCREENING: Primary | ICD-10-CM

## 2024-12-24 DIAGNOSIS — Z01.419 GYNECOLOGIC EXAM NORMAL: ICD-10-CM

## 2024-12-24 DIAGNOSIS — Z98.890 HISTORY OF ENDOMETRIAL ABLATION: ICD-10-CM

## 2024-12-24 DIAGNOSIS — N95.1 MENOPAUSAL SYNDROME: ICD-10-CM

## 2024-12-24 DIAGNOSIS — M54.50 ACUTE MIDLINE LOW BACK PAIN WITHOUT SCIATICA: ICD-10-CM

## 2024-12-24 PROCEDURE — 99396 PREV VISIT EST AGE 40-64: CPT | Performed by: OBSTETRICS & GYNECOLOGY

## 2024-12-24 RX ORDER — CYCLOBENZAPRINE HCL 10 MG
10 TABLET ORAL 3 TIMES DAILY PRN
Qty: 30 TABLET | Refills: 0 | Status: SHIPPED | OUTPATIENT
Start: 2024-12-24 | End: 2025-01-03

## 2024-12-24 ASSESSMENT — ENCOUNTER SYMPTOMS
RESPIRATORY NEGATIVE: 1
GASTROINTESTINAL NEGATIVE: 1

## 2024-12-24 NOTE — PROGRESS NOTES
Chief Complaint   Patient presents with    Annual Exam     Has order for mammogram. Will call to schedule     /77 (Site: Left Upper Arm, Position: Sitting, Cuff Size: Large Adult)   Ht 1.626 m (5' 4\")   Wt 79 kg (174 lb 3 oz)   BMI 29.90 kg/m²     
25 MCG (1000 UT) TABS tablet Take by mouth daily   Yes Automatic Reconciliation, Ar   clotrimazole-betamethasone (LOTRISONE) 1-0.05 % cream Apply a small amount BID to affected area as needed 12/22/20  Yes Automatic Reconciliation, Ar   diclofenac sodium (VOLTAREN) 1 % GEL apply 2 GRAMS topically twice a day to affected area 12/19/17  Yes Automatic Reconciliation, Ar   escitalopram (LEXAPRO) 20 MG tablet Take 1 tablet by mouth daily   Yes Automatic Reconciliation, Ar   esomeprazole (NEXIUM) 40 MG delayed release capsule Take 1 capsule by mouth daily 1/13/18  Yes Automatic Reconciliation, Ar   etodolac (LODINE) 400 MG tablet Take by mouth 2 times daily (with meals)   Yes Automatic Reconciliation, Ar   gabapentin (NEURONTIN) 600 MG tablet Take by mouth 3 times daily.   Yes Automatic Reconciliation, Ar   hydroCHLOROthiazide (HYDRODIURIL) 12.5 MG tablet Take 1 tablet by mouth daily 1/13/18  Yes Automatic Reconciliation, Ar   liraglutide-weight management (SAXENDA) 18 MG/3ML SOPN Inject 1.2 mg into the skin daily 1/22/18  Yes Automatic Reconciliation, Ar   meloxicam (MOBIC) 15 MG tablet Take 1 tablet by mouth daily   Yes Automatic Reconciliation, Ar   metoprolol tartrate (LOPRESSOR) 25 MG tablet Take by mouth 2 times daily   Yes Automatic Reconciliation, Ar   rosuvastatin (CRESTOR) 10 MG tablet Take 1 tablet by mouth daily 1/22/18  Yes Automatic Reconciliation, Ar   trospium (SANCTURA) 20 MG tablet Take 1 tablet by mouth 3 times daily (before meals)   Yes Automatic Reconciliation, Ar       Allergies   Allergen Reactions    Penicillins Rash          Tobacco History:  reports that she has been smoking cigarettes. She has never used smokeless tobacco.  Alcohol use:  reports no history of alcohol use.  Drug use:  reports no history of drug use.    Family Medical/Cancer History:   Family History   Problem Relation Age of Onset    Cancer Paternal Grandfather     Cancer Paternal Grandmother     Heart Disease Maternal

## 2024-12-28 DIAGNOSIS — M54.50 ACUTE MIDLINE LOW BACK PAIN WITHOUT SCIATICA: ICD-10-CM

## 2024-12-30 RX ORDER — CYCLOBENZAPRINE HCL 10 MG
10 TABLET ORAL 3 TIMES DAILY PRN
Qty: 30 TABLET | Refills: 0 | OUTPATIENT
Start: 2024-12-30

## 2024-12-31 LAB
., LABCORP: NORMAL
CYTOLOGIST CVX/VAG CYTO: NORMAL
CYTOLOGY CVX/VAG DOC CYTO: NORMAL
CYTOLOGY CVX/VAG DOC THIN PREP: NORMAL
DX ICD CODE: NORMAL
Lab: NORMAL
OTHER STN SPEC: NORMAL
STAT OF ADQ CVX/VAG CYTO-IMP: NORMAL

## 2025-01-03 ENCOUNTER — HOSPITAL ENCOUNTER (OUTPATIENT)
Facility: HOSPITAL | Age: 55
Discharge: HOME | End: 2025-01-03
Attending: OBSTETRICS & GYNECOLOGY
Payer: COMMERCIAL

## 2025-01-03 DIAGNOSIS — Z12.31 SCREENING MAMMOGRAM, ENCOUNTER FOR: ICD-10-CM

## 2025-01-03 PROCEDURE — 77063 BREAST TOMOSYNTHESIS BI: CPT

## 2025-04-16 NOTE — PROGRESS NOTES
Genetic Testing results: Negative  Pt given results by staff  Full results report in chart Pharmacy change - remaining refills sent.